# Patient Record
Sex: FEMALE | Race: AMERICAN INDIAN OR ALASKA NATIVE
[De-identification: names, ages, dates, MRNs, and addresses within clinical notes are randomized per-mention and may not be internally consistent; named-entity substitution may affect disease eponyms.]

---

## 2020-06-09 ENCOUNTER — HOSPITAL ENCOUNTER (EMERGENCY)
Dept: HOSPITAL 5 - ED | Age: 25
Discharge: HOME | End: 2020-06-09
Payer: SELF-PAY

## 2020-06-09 VITALS — DIASTOLIC BLOOD PRESSURE: 91 MMHG | SYSTOLIC BLOOD PRESSURE: 139 MMHG

## 2020-06-09 DIAGNOSIS — N39.0: Primary | ICD-10-CM

## 2020-06-09 LAB
BILIRUB UR QL STRIP: (no result)
BLOOD UR QL VISUAL: (no result)
PH UR STRIP: 6 [PH] (ref 5–7)
PROT UR STRIP-MCNC: (no result) MG/DL
RBC #/AREA URNS HPF: 20 /HPF (ref 0–6)
UROBILINOGEN UR-MCNC: < 2 MG/DL (ref ?–2)
WBC #/AREA URNS HPF: 133 /HPF (ref 0–6)

## 2020-06-09 PROCEDURE — 81025 URINE PREGNANCY TEST: CPT

## 2020-06-09 PROCEDURE — 81001 URINALYSIS AUTO W/SCOPE: CPT

## 2020-06-09 PROCEDURE — 99283 EMERGENCY DEPT VISIT LOW MDM: CPT

## 2021-08-05 ENCOUNTER — HOSPITAL ENCOUNTER (EMERGENCY)
Dept: HOSPITAL 5 - ED | Age: 26
LOS: 1 days | Discharge: HOME | End: 2021-08-06
Payer: MEDICAID

## 2021-08-05 DIAGNOSIS — Z79.899: ICD-10-CM

## 2021-08-05 DIAGNOSIS — N76.0: ICD-10-CM

## 2021-08-05 DIAGNOSIS — B37.41: Primary | ICD-10-CM

## 2021-08-05 PROCEDURE — 81025 URINE PREGNANCY TEST: CPT

## 2021-08-05 PROCEDURE — 81001 URINALYSIS AUTO W/SCOPE: CPT

## 2021-08-06 VITALS — SYSTOLIC BLOOD PRESSURE: 163 MMHG | DIASTOLIC BLOOD PRESSURE: 100 MMHG

## 2021-08-06 LAB
BILIRUB UR QL STRIP: (no result)
BLOOD UR QL VISUAL: (no result)
PH UR STRIP: 6 [PH] (ref 5–7)
PROT UR STRIP-MCNC: (no result) MG/DL
RBC #/AREA URNS HPF: 2 /HPF (ref 0–6)
UROBILINOGEN UR-MCNC: < 2 MG/DL (ref ?–2)
WBC #/AREA URNS HPF: 3 /HPF (ref 0–6)

## 2021-08-06 RX ADMIN — FLUCONAZOLE ONE: 200 TABLET ORAL at 03:55

## 2021-08-06 RX ADMIN — FLUCONAZOLE ONE MG: 200 TABLET ORAL at 03:48

## 2021-08-06 NOTE — EMERGENCY DEPARTMENT REPORT
ED Abdominal Pain HPI





- General


Chief Complaint: Urogenital-Female


Stated Complaint: UTI


Time Seen by Provider: 08/06/21 01:56


Source: patient


Mode of arrival: Ambulatory


Limitations: No Limitations





- History of Present Illness


Initial Comments: 


Is a 26-year-old female who presents for urinary urgency mild hematuria and 

vaginal odor x3 days.  Patient denies fevers or chills, there is no nausea 

vomiting, no fever or chills. LMP 2 weeks ago.  Symptoms are described as 4/10. 

Symptoms are relieved by nothing.  Symptoms are exacerbated by palpation and m

ovement.





MD Complaint: abdominal pain





- Related Data


                                  Previous Rx's











 Medication  Instructions  Recorded  Last Taken  Type


 


Fluconazole (Nf) [Diflucan TAB] 150 mg PO ONCE #1 tablet 06/09/20 Unknown Rx


 


Ibuprofen [Motrin] 600 mg PO Q8H PRN #15 tablet 06/09/20 Unknown Rx


 


Phenazopyridine [Pyridium] 200 mg PO Q8H #21 tab 06/09/20 Unknown Rx


 


cephALEXin [Keflex] 500 mg PO Q8HR #30 cap 06/09/20 Unknown Rx


 


metroNIDAZOLE [Flagyl] 500 mg PO Q12HR #14 tab 08/06/21 Unknown Rx











                                    Allergies











Allergy/AdvReac Type Severity Reaction Status Date / Time


 


No Known Allergies Allergy   Unverified 06/09/20 00:54














ED Review of Systems


ROS: 


Stated complaint: UTI


Other details as noted in HPI





Constitutional: denies: chills, fever


Eyes: denies: eye pain, eye discharge, vision change


ENT: denies: ear pain, throat pain


Respiratory: denies: cough, shortness of breath, wheezing


Cardiovascular: denies: chest pain, palpitations


Endocrine: no symptoms reported


Gastrointestinal: abdominal pain, nausea.  denies: diarrhea


Genitourinary: urgency, dysuria, frequency, hematuria.  denies: discharge


Musculoskeletal: denies: back pain, joint swelling, arthralgia


Skin: denies: rash, lesions


Neurological: denies: headache, weakness, paresthesias


Psychiatric: denies: anxiety, depression


Hematological/Lymphatic: denies: easy bleeding, easy bruising





ED Past Medical Hx





- Past Medical History


Previous Medical History?: No





- Surgical History


Past Surgical History?: No





- Social History


Smoking Status: Never Smoker


Substance Use Type: None





- Medications


Home Medications: 


                                Home Medications











 Medication  Instructions  Recorded  Confirmed  Last Taken  Type


 


Fluconazole (Nf) [Diflucan TAB] 150 mg PO ONCE #1 tablet 06/09/20  Unknown Rx


 


Ibuprofen [Motrin] 600 mg PO Q8H PRN #15 tablet 06/09/20  Unknown Rx


 


Phenazopyridine [Pyridium] 200 mg PO Q8H #21 tab 06/09/20  Unknown Rx


 


cephALEXin [Keflex] 500 mg PO Q8HR #30 cap 06/09/20  Unknown Rx


 


metroNIDAZOLE [Flagyl] 500 mg PO Q12HR #14 tab 08/06/21  Unknown Rx














ED Physical Exam





- General


Limitations: No Limitations


General appearance: alert, in no apparent distress





- Head


Head exam: Present: atraumatic, normocephalic





- Eye


Eye exam: Present: normal appearance, EOMI


Pupils: Present: normal accommodation





- ENT


ENT exam: Present: mucous membranes moist





- Neck


Neck exam: Present: normal inspection, full ROM.  Absent: tenderness





- Respiratory


Respiratory exam: Present: normal lung sounds bilaterally.  Absent: respiratory 

distress, wheezes, stridor, chest wall tenderness





- Cardiovascular


Cardiovascular Exam: Present: regular rate, normal rhythm, normal heart sounds. 

 Absent: systolic murmur, diastolic murmur, rubs, gallop





- GI/Abdominal


GI/Abdominal exam: Present: soft, normal bowel sounds.  Absent: distended, 

tenderness, guarding





- Rectal


Rectal exam: Present: deferred





- 


External exam: Present: other (deferred)





- Extremities Exam


Extremities exam: Present: normal inspection, full ROM.  Absent: tenderness





- Back Exam


Back exam: Present: normal inspection, full ROM.  Absent: CVA tenderness (R), 

CVA tenderness (L)





- Neurological Exam


Neurological exam: Present: alert, oriented X3, CN II-XII intact





- Psychiatric


Psychiatric exam: Present: normal affect





- Skin


Skin exam: Present: warm, dry, intact, normal color.  Absent: rash





ED Course


                                   Vital Signs











  08/06/21





  01:01


 


Temperature 98.6 F


 


Pulse Rate 67


 


Respiratory 18





Rate 


 


Blood Pressure 163/100


 


O2 Sat by Pulse 100





Oximetry 














ED Medical Decision Making





- Lab Data








Labs











  08/06/21





  00:45


 


Urine Color  Straw


 


Urine Turbidity  Clear


 


Urine pH  6.0


 


Ur Specific Gravity  1.010


 


Urine Protein  <15 mg/dl


 


Urine Glucose (UA)  Neg


 


Urine Ketones  Neg


 


Urine Blood  Neg


 


Urine Nitrite  Neg


 


Urine Bilirubin  Neg


 


Urine Urobilinogen  < 2.0


 


Ur Leukocyte Esterase  Neg


 


Urine WBC (Auto)  3.0


 


Urine RBC (Auto)  2.0


 


U Epithel Cells (Auto)  2.0


 


Urine Yeast (Budding)  Few


 


Urine HCG, Qual  Negative














- Medical Decision Making


ua noted for yeast, min wbc, plan: dc to home , follow up with pcp in 2-3 days, 

return to emergency if symptoms worsen. 





Critical care attestation.: 


If time is entered above; I have spent that time in minutes in the direct care 

of this critically ill patient, excluding procedure time.








ED Disposition


Clinical Impression: 


 Candida cystitis





Vaginitis


Qualifiers:


 Chronicity: acute Qualified Code(s): N76.0 - Acute vaginitis





Disposition: DC-01 TO HOME OR SELFCARE


Is pt being admited?: No


Does the pt Need Aspirin: No


Condition: Stable


Instructions:  Vaginitis


Additional Instructions: 


Take medications as prescribed, do not drink alcohol with this medication 

(flagyl),  follow up with your doctor in 2-3 days, return to emergency if 

symptoms worsen. 


Prescriptions: 


metroNIDAZOLE [Flagyl] 500 mg PO Q12HR #14 tab


Referrals: 


[facility] [Provider Group] - 3-5 Days


Forms:  Work/School Release Form(ED)


Time of Disposition: 03:35